# Patient Record
Sex: MALE | URBAN - METROPOLITAN AREA
[De-identification: names, ages, dates, MRNs, and addresses within clinical notes are randomized per-mention and may not be internally consistent; named-entity substitution may affect disease eponyms.]

---

## 2018-08-04 ENCOUNTER — OFFICE VISIT (OUTPATIENT)
Dept: URGENT CARE | Facility: CLINIC | Age: 53
End: 2018-08-04
Payer: COMMERCIAL

## 2018-08-04 VITALS
RESPIRATION RATE: 18 BRPM | SYSTOLIC BLOOD PRESSURE: 148 MMHG | DIASTOLIC BLOOD PRESSURE: 90 MMHG | TEMPERATURE: 97.3 F | HEART RATE: 78 BPM | BODY MASS INDEX: 28.73 KG/M2 | OXYGEN SATURATION: 98 % | HEIGHT: 73 IN | WEIGHT: 216.8 LBS

## 2018-08-04 DIAGNOSIS — H93.8X1 SENSATION OF FULLNESS IN EAR, RIGHT: ICD-10-CM

## 2018-08-04 DIAGNOSIS — H69.91 EUSTACHIAN TUBE DYSFUNCTION, RIGHT: ICD-10-CM

## 2018-08-04 PROCEDURE — 99203 OFFICE O/P NEW LOW 30 MIN: CPT | Performed by: NURSE PRACTITIONER

## 2018-08-04 RX ORDER — METOPROLOL SUCCINATE 25 MG/1
25 TABLET, EXTENDED RELEASE ORAL DAILY
COMMUNITY

## 2018-08-04 RX ORDER — CEPHALEXIN 500 MG/1
CAPSULE ORAL
Refills: 0 | COMMUNITY
Start: 2018-07-16

## 2018-08-04 ASSESSMENT — PAIN SCALES - GENERAL: PAINLEVEL: NO PAIN

## 2018-08-04 NOTE — PROGRESS NOTES
"Subjective:      Bobby Diana is a 52 y.o. male who presents with Ear Fullness (feels like fluid in ear )            Ear Fullness   This is a new problem. Episode onset: pt reports onset of ear fullness x 3 days. He admits it feels like there is fluid sloshing around. He also states he cannot hear well out of his right ear. Denies any recent fever. The problem occurs constantly. The problem has been unchanged. He has tried nothing for the symptoms.       Review of Systems   HENT: Positive for ear pain. Negative for ear discharge.    All other systems reviewed and are negative.    History reviewed. No pertinent past medical history. History reviewed. No pertinent surgical history.   Social History     Social History   • Marital status: Unknown     Spouse name: N/A   • Number of children: N/A   • Years of education: N/A     Occupational History   • Not on file.     Social History Main Topics   • Smoking status: Never Smoker   • Smokeless tobacco: Never Used   • Alcohol use No   • Drug use: No   • Sexual activity: Not on file     Other Topics Concern   • Not on file     Social History Narrative   • No narrative on file          Objective:     /90   Pulse 78   Temp 36.3 °C (97.3 °F)   Resp 18   Ht 1.854 m (6' 1\")   Wt 98.3 kg (216 lb 12.8 oz)   SpO2 98%   BMI 28.60 kg/m²      Physical Exam   Constitutional: He is oriented to person, place, and time. Vital signs are normal. He appears well-developed and well-nourished.   HENT:   Head: Normocephalic and atraumatic.   Right Ear: External ear normal.   Left Ear: Tympanic membrane and external ear normal.   Nose: Nose normal.   Right TM presents with clear fluid and several clusters of bubbles behind TM  TM slightly bulging   Eyes: Pupils are equal, round, and reactive to light. EOM are normal.   Neck: Normal range of motion.   Cardiovascular: Normal rate and regular rhythm.    Pulmonary/Chest: Effort normal.   Musculoskeletal: Normal range of motion. "   Neurological: He is alert and oriented to person, place, and time.   Skin: Skin is warm and dry. Capillary refill takes less than 2 seconds.   Psychiatric: He has a normal mood and affect. His speech is normal and behavior is normal. Thought content normal.   Vitals reviewed.              Assessment/Plan:     1. Eustachian tube dysfunction, right    2. Sensation of fullness in ear, right    OTC flonase twice daily for one week  OTC sudafed as directed  OTC Zyrtec daily for 2 weeks  Alternate tylenol and ibuprofen as needed for pain  Supportive care, differential diagnoses, and indications for immediate follow-up discussed with patient.    Pathogenesis of diagnosis discussed including typical length and natural progression.      Instructed to return to  or nearest emergency department if symptoms fail to improve, for any change in condition, further concerns, or new concerning symptoms.  Patient states understanding of the plan of care and discharge instructions.